# Patient Record
Sex: MALE | URBAN - METROPOLITAN AREA
[De-identification: names, ages, dates, MRNs, and addresses within clinical notes are randomized per-mention and may not be internally consistent; named-entity substitution may affect disease eponyms.]

---

## 2021-03-30 ENCOUNTER — APPOINTMENT (OUTPATIENT)
Dept: URBAN - METROPOLITAN AREA CLINIC 285 | Age: 46
Setting detail: DERMATOLOGY
End: 2021-03-30

## 2021-03-30 DIAGNOSIS — L738 OTHER SPECIFIED DISEASES OF HAIR AND HAIR FOLLICLES: ICD-10-CM

## 2021-03-30 DIAGNOSIS — L663 OTHER SPECIFIED DISEASES OF HAIR AND HAIR FOLLICLES: ICD-10-CM

## 2021-03-30 PROBLEM — L02.92 FURUNCLE, UNSPECIFIED: Status: ACTIVE | Noted: 2021-03-30

## 2021-03-30 PROCEDURE — OTHER PRESCRIPTION: OTHER

## 2021-03-30 PROCEDURE — 99203 OFFICE O/P NEW LOW 30 MIN: CPT

## 2021-03-30 PROCEDURE — OTHER PRESCRIPTION MEDICATION MANAGEMENT: OTHER

## 2021-03-30 PROCEDURE — OTHER MIPS QUALITY: OTHER

## 2021-03-30 PROCEDURE — OTHER MEDICATION COUNSELING: OTHER

## 2021-03-30 RX ORDER — BENZOYL PEROXIDE 5 G/100G
LIQUID TOPICAL
Qty: 1 | Refills: 2 | Status: ERX | COMMUNITY
Start: 2021-03-30

## 2021-03-30 RX ORDER — CLINDAMYCIN PHOSPHATE 10 MG/ML
SOLUTION TOPICAL
Qty: 60 | Refills: 2 | Status: ERX | COMMUNITY
Start: 2021-03-30

## 2021-03-30 RX ORDER — MINOCYCLINE HYDROCHLORIDE 100 MG/1
CAPSULE ORAL
Qty: 60 | Refills: 2 | Status: ERX | COMMUNITY
Start: 2021-03-30

## 2021-03-30 NOTE — HPI: SKIN LESIONS
How Severe Is Your Skin Lesion?: mild
Have Your Skin Lesions Been Treated?: not been treated
Is This A New Presentation, Or A Follow-Up?: Skin Lesions
Additional History: Pt normally takes heavy course of antibiotics for boils

## 2021-03-30 NOTE — PROCEDURE: MEDICATION COUNSELING
Xeldianelysz Pregnancy And Lactation Text: This medication is Pregnancy Category D and is not considered safe during pregnancy.  The risk during breast feeding is also uncertain.

## 2021-03-30 NOTE — PROCEDURE: PRESCRIPTION MEDICATION MANAGEMENT
Attempted to reach patient to convey results of lab. No answer, left message requesting patient to call back. Please route call to me. Thanks.
He will call back latter today
Patient returned call, no one was available , please call back.
Spoke with patient. Conveyed results of glutamic acid decarboxylase antibody, strongly indicating type 1 diabetes. Patient did not  Lantus yet; his mother was picking up today. He believes [facility] endocrinology is out of network, he will call to confirm and let us know if he would like referral to [address] in [facility]. Advised patient to follow-up around 7/9/20 as discussed, sooner PRN. Patient wants to call back to schedule. Questions answered.
Initiate Treatment: benzoyl peroxide 5 % topical cleanser. Apply to Buttocks and affected area in groin every day in shower then rinse\\n\\nclindamycin phosphate 1 % topical swab. Use the swabs twice daily to affected areas on the buttocks and groin\\n\\nminocycline 100 mg capsule. Take one pill twice daily
Detail Level: Zone
Render In Strict Bullet Format?: No

## 2022-03-11 NOTE — PROCEDURE: MEDICATION COUNSELING
LVM for pt regarding referral to Genetics    Eucrisa Counseling: Patient may experience a mild burning sensation during topical application. Eucrisa is not approved in children less than 2 years of age.

## 2024-01-18 ENCOUNTER — APPOINTMENT (OUTPATIENT)
Dept: URBAN - NONMETROPOLITAN AREA CLINIC 52 | Age: 49
Setting detail: DERMATOLOGY
End: 2024-01-22

## 2024-01-18 ENCOUNTER — RX ONLY (RX ONLY)
Age: 49
End: 2024-01-18

## 2024-01-18 DIAGNOSIS — L30.9 DERMATITIS, UNSPECIFIED: ICD-10-CM

## 2024-01-18 PROCEDURE — OTHER TREATMENT REGIMEN: OTHER

## 2024-01-18 PROCEDURE — OTHER COUNSELING: OTHER

## 2024-01-18 PROCEDURE — 99213 OFFICE O/P EST LOW 20 MIN: CPT

## 2024-01-18 PROCEDURE — OTHER MIPS QUALITY: OTHER

## 2024-01-18 PROCEDURE — OTHER PRESCRIPTION: OTHER

## 2024-01-18 RX ORDER — CLINDAMYCIN PHOSPHATE 10 MG/ML
SOLUTION TOPICAL
Qty: 60 | Refills: 2 | Status: ERX | COMMUNITY
Start: 2024-01-18

## 2024-01-18 RX ORDER — MINOCYCLINE HYDROCHLORIDE 100 MG/1
CAPSULE ORAL
Qty: 60 | Refills: 1 | Status: ERX | COMMUNITY
Start: 2024-01-18

## 2024-01-18 ASSESSMENT — LOCATION ZONE DERM
LOCATION ZONE: AXILLAE
LOCATION ZONE: PENIS

## 2024-01-18 ASSESSMENT — LOCATION SIMPLE DESCRIPTION DERM
LOCATION SIMPLE: PENIS
LOCATION SIMPLE: LEFT AXILLARY VAULT
LOCATION SIMPLE: RIGHT AXILLARY VAULT

## 2024-01-18 ASSESSMENT — LOCATION DETAILED DESCRIPTION DERM
LOCATION DETAILED: DORSAL CORONAL SULCUS
LOCATION DETAILED: RIGHT AXILLARY VAULT
LOCATION DETAILED: LEFT AXILLARY VAULT

## 2024-01-18 NOTE — PROCEDURE: TREATMENT REGIMEN
[] : Resident
Initiate Treatment: minocycline 100 mg capsule: Take one capsule daily.\\n\\nClindamycin topical
Detail Level: Zone
Otc Regimen: BPO cleanser
Plan: Recommended Zinc ointment after cleansing.

## 2024-01-18 NOTE — HPI: BOILS
How Severe Are Your Boils?: moderate
Is This A New Presentation, Or A Follow-Up?: Radha
Additional History: Has history of boils.